# Patient Record
Sex: MALE | Race: WHITE | Employment: OTHER | URBAN - NONMETROPOLITAN AREA
[De-identification: names, ages, dates, MRNs, and addresses within clinical notes are randomized per-mention and may not be internally consistent; named-entity substitution may affect disease eponyms.]

---

## 2023-10-12 ENCOUNTER — OFFICE VISIT (OUTPATIENT)
Dept: PULMONOLOGY | Age: 68
End: 2023-10-12
Payer: MEDICARE

## 2023-10-12 VITALS
SYSTOLIC BLOOD PRESSURE: 118 MMHG | HEART RATE: 69 BPM | WEIGHT: 315 LBS | OXYGEN SATURATION: 96 % | BODY MASS INDEX: 42.66 KG/M2 | HEIGHT: 72 IN | TEMPERATURE: 98.9 F | DIASTOLIC BLOOD PRESSURE: 68 MMHG

## 2023-10-12 DIAGNOSIS — J44.9 STAGE 3 SEVERE COPD BY GOLD CLASSIFICATION (HCC): ICD-10-CM

## 2023-10-12 DIAGNOSIS — J44.9 OSA AND COPD OVERLAP SYNDROME (HCC): Primary | ICD-10-CM

## 2023-10-12 DIAGNOSIS — E66.01 MORBID OBESITY WITH BMI OF 50.0-59.9, ADULT (HCC): ICD-10-CM

## 2023-10-12 DIAGNOSIS — J44.9 COPD, SEVERE (HCC): ICD-10-CM

## 2023-10-12 DIAGNOSIS — G47.33 OSA AND COPD OVERLAP SYNDROME (HCC): Primary | ICD-10-CM

## 2023-10-12 DIAGNOSIS — Z87.891 PERSONAL HISTORY OF SMOKING: ICD-10-CM

## 2023-10-12 PROCEDURE — 1123F ACP DISCUSS/DSCN MKR DOCD: CPT | Performed by: INTERNAL MEDICINE

## 2023-10-12 PROCEDURE — 99204 OFFICE O/P NEW MOD 45 MIN: CPT | Performed by: INTERNAL MEDICINE

## 2023-10-12 RX ORDER — PANTOPRAZOLE SODIUM 40 MG/1
40 TABLET, DELAYED RELEASE ORAL DAILY
COMMUNITY

## 2023-10-12 RX ORDER — TRAZODONE HYDROCHLORIDE 50 MG/1
100 TABLET ORAL NIGHTLY
COMMUNITY
Start: 2015-12-11

## 2023-10-12 RX ORDER — SPIRONOLACTONE 25 MG/1
25 TABLET ORAL
COMMUNITY

## 2023-10-12 RX ORDER — EPINEPHRINE 0.3 MG/.3ML
INJECTION SUBCUTANEOUS
COMMUNITY
Start: 2023-09-27

## 2023-10-12 RX ORDER — DUPILUMAB 300 MG/2ML
300 INJECTION, SOLUTION SUBCUTANEOUS ONCE
COMMUNITY

## 2023-10-12 RX ORDER — TORSEMIDE 100 MG/1
100 TABLET ORAL DAILY
COMMUNITY
Start: 2023-09-05

## 2023-10-12 RX ORDER — LORAZEPAM 1 MG/1
1 TABLET ORAL 2 TIMES DAILY
COMMUNITY
Start: 2012-11-23

## 2023-10-12 RX ORDER — ALBUTEROL SULFATE 90 UG/1
2 AEROSOL, METERED RESPIRATORY (INHALATION) EVERY 4 HOURS PRN
COMMUNITY

## 2023-10-12 RX ORDER — ACETAMINOPHEN 325 MG/1
650 TABLET ORAL EVERY 6 HOURS PRN
COMMUNITY

## 2023-10-12 RX ORDER — POTASSIUM CHLORIDE 750 MG/1
10 TABLET, EXTENDED RELEASE ORAL 4 TIMES DAILY
COMMUNITY

## 2023-10-12 ASSESSMENT — ENCOUNTER SYMPTOMS
VOICE CHANGE: 0
BACK PAIN: 1
ABDOMINAL PAIN: 0
STRIDOR: 0
COUGH: 1
SHORTNESS OF BREATH: 1
CHOKING: 0
TROUBLE SWALLOWING: 0
WHEEZING: 1
ABDOMINAL DISTENTION: 0
CHEST TIGHTNESS: 0

## 2023-10-12 NOTE — PROGRESS NOTES
Subjective:      Patient ID: Pari Paige is a 79 y.o. male. CC: COPD  HPI    Patient is a 77-year-old gentleman referred by his cardiologist Dr. Janee Barber due to recent diagnosis of COPD. 50-pack-year smoker quit smoking about 3 months ago morbidly obese with a BMI of 53 complains of dyspnea on exertion would let Dr. Terryl Jeans to perform pulmonary function test at [facility] revealing severe obstructive pulmonary disease currently on no medications  6-minute walk completed in my office today reveals no need for supplemental oxygen    Bruce Chen has a extensive history of heart disease with requiring paroxysmal atrial fibrillation requiring at least 5 cardioversions and eventually an ablation, history of nasal polyps under the care of of ENT in [place] Dr. Tania Duffy and referred to the [facility] asthma center Dr. Hugo Oswald for Dupixent injections. Bruce Chen tells me that his primary care physician in [place] is in charge of doing his screening CT scans of the chest for early diagnosis of lung cancer. 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 HENT:  Negative for trouble swallowing and voice change. Eyes:  Negative for visual disturbance. Respiratory:  Positive for cough, shortness of breath and wheezing. Negative for choking, chest tightness and stridor. Cardiovascular:  Positive for chest pain, palpitations and leg swelling. Gastrointestinal:  Negative for abdominal distention and abdominal pain.    Endocrine:

## 2023-11-07 ENCOUNTER — TELEPHONE (OUTPATIENT)
Dept: PULMONOLOGY | Age: 68
End: 2023-11-07

## 2023-11-07 NOTE — TELEPHONE ENCOUNTER
Patient called in today asking about patient assistance programs for trelegy . .. I advised him to go to [employer] to view forms he needs to fill out. He also stated that he he does not have drug coverage and was wondering about some more trelegy samples while he gets this sorted out. He stated the trelegy is helping him but is unaffordable. He did say you mentioned another alternative if trelegy was too expensive but I didn't see it in the notes. he stated he will be out tomorrow. Please advise, thank you !

## 2023-11-08 NOTE — TELEPHONE ENCOUNTER
Patient is calling to check on this. He is wanting to know if it would be at all possible for him to  the samples from the [facility] office tomorrow, Thursday 11/08/2023.  Please advise

## 2023-11-08 NOTE — TELEPHONE ENCOUNTER
I do not see which Trelegy you provided samples of . If you let me know which dosage and how many samples I will put them up front for the patient and notify him. Thanks!

## 2023-11-09 NOTE — TELEPHONE ENCOUNTER
PATIENT CALLED AND SAID HE HAS NO MEDICATION LEFT AND NEEDS THE TRELEGY I TOLD HIM I WOULD PUT IN A MESSAGE AND GET BACK TO HIM AS SOON AS I GOT AN ANSWER    PLEASE ADVISE

## 2023-11-13 ENCOUNTER — TELEPHONE (OUTPATIENT)
Dept: PULMONOLOGY | Age: 68
End: 2023-11-13

## 2023-11-13 NOTE — TELEPHONE ENCOUNTER
Patient called regarding the samples. Patient informed that I would get 3 samples ready for him and they will be at the  at the [facility] office. Patient voiced understanding and will  today.

## 2023-11-13 NOTE — TELEPHONE ENCOUNTER
Pt stopped in office today to drop off [employer] paperwork for us to fax. Could you please write an Rx for Trelegy for us to fax with the [employer] paperwork? Please advise, thank you.

## 2023-11-13 NOTE — TELEPHONE ENCOUNTER
Dr. Sarah Stephenson,   Can you print a script for us to fax to [address] [place] [address] patient assistance program?

## 2024-04-16 ENCOUNTER — HOSPITAL ENCOUNTER (OUTPATIENT)
Dept: CT IMAGING | Age: 69
Discharge: HOME | End: 2024-04-16
Attending: RADIOLOGY

## 2024-04-16 DIAGNOSIS — Z00.6 ENCOUNTER FOR EXAMINATION FOR NORMAL COMPARISON OR CONTROL IN CLINICAL RESEARCH PROGRAM: ICD-10-CM

## 2024-04-17 ENCOUNTER — OFFICE VISIT (OUTPATIENT)
Dept: PULMONOLOGY | Age: 69
End: 2024-04-17
Payer: MEDICARE

## 2024-04-17 VITALS
HEART RATE: 59 BPM | SYSTOLIC BLOOD PRESSURE: 128 MMHG | HEIGHT: 72 IN | WEIGHT: 315 LBS | TEMPERATURE: 98.7 F | OXYGEN SATURATION: 94 % | BODY MASS INDEX: 42.66 KG/M2 | DIASTOLIC BLOOD PRESSURE: 70 MMHG

## 2024-04-17 DIAGNOSIS — G47.33 OSA AND COPD OVERLAP SYNDROME (HCC): ICD-10-CM

## 2024-04-17 DIAGNOSIS — Z87.891 PERSONAL HISTORY OF SMOKING: ICD-10-CM

## 2024-04-17 DIAGNOSIS — J44.9 STAGE 3 SEVERE COPD BY GOLD CLASSIFICATION (HCC): Primary | ICD-10-CM

## 2024-04-17 DIAGNOSIS — Z87.891 PERSONAL HISTORY OF TOBACCO USE: ICD-10-CM

## 2024-04-17 DIAGNOSIS — J44.9 OSA AND COPD OVERLAP SYNDROME (HCC): ICD-10-CM

## 2024-04-17 DIAGNOSIS — E66.01 MORBID OBESITY WITH BMI OF 50.0-59.9, ADULT (HCC): ICD-10-CM

## 2024-04-17 PROCEDURE — 3078F DIAST BP <80 MM HG: CPT | Performed by: NURSE PRACTITIONER

## 2024-04-17 PROCEDURE — 3074F SYST BP LT 130 MM HG: CPT | Performed by: NURSE PRACTITIONER

## 2024-04-17 PROCEDURE — G0296 VISIT TO DETERM LDCT ELIG: HCPCS | Performed by: NURSE PRACTITIONER

## 2024-04-17 PROCEDURE — 1123F ACP DISCUSS/DSCN MKR DOCD: CPT | Performed by: NURSE PRACTITIONER

## 2024-04-17 PROCEDURE — 99214 OFFICE O/P EST MOD 30 MIN: CPT | Performed by: NURSE PRACTITIONER

## 2024-04-17 RX ORDER — M-VIT,TX,IRON,MINS/CALC/FOLIC 27MG-0.4MG
1 TABLET ORAL DAILY
COMMUNITY

## 2024-04-17 RX ORDER — LANOLIN ALCOHOL/MO/W.PET/CERES
400 CREAM (GRAM) TOPICAL DAILY
COMMUNITY

## 2024-04-17 ASSESSMENT — ENCOUNTER SYMPTOMS
SHORTNESS OF BREATH: 1
BACK PAIN: 1

## 2024-04-17 NOTE — PROGRESS NOTES
Wt Readings from Last 3 Encounters:   24 (!) 176.9 kg (390 lb)   10/12/23 (!) 179.4 kg (395 lb 6.4 oz)       Physical Exam  Vitals and nursing note reviewed.   Constitutional:       General: He is not in acute distress.     Appearance: He is well-developed. He is morbidly obese.   HENT:      Mouth/Throat:      Lips: Pink.      Mouth: Mucous membranes are moist.      Pharynx: Oropharynx is clear. No oropharyngeal exudate or posterior oropharyngeal erythema.   Eyes:      Conjunctiva/sclera: Conjunctivae normal.   Neck:      Vascular: No JVD.   Cardiovascular:      Rate and Rhythm: Normal rate and regular rhythm.      Heart sounds: No murmur heard.     No friction rub.   Pulmonary:      Effort: Pulmonary effort is normal. No accessory muscle usage or respiratory distress.      Breath sounds: Normal breath sounds. No wheezing, rhonchi or rales.   Chest:      Chest wall: No tenderness.   Musculoskeletal:      Right lower le+ Edema present.      Left lower le+ Edema present.   Skin:     General: Skin is warm and dry.      Capillary Refill: Capillary refill takes less than 2 seconds.      Nails: There is no clubbing.   Neurological:      Mental Status: He is alert and oriented to person, place, and time.   Psychiatric:         Mood and Affect: Mood normal.         Behavior: Behavior normal.         Thought Content: Thought content normal.         Judgment: Judgment normal.           Test results   Lung Nodule Screening     [] Qualifies    [x]Does not qualify   [] Declined    [] Completed    Assessment       ICD-10-CM    1. Stage 3 severe COPD by GOLD classification ([facility])  J44.9       2. Morbid obesity with BMI of 50.0-59.9, adult ([facility])  E66.01     Z68.43       3. AIDE and COPD overlap syndrome ([facility])  G47.33     J44.9       4. Personal history of smoking  Z87.891             Johnathan Weinstein was seen today for follow-up.    Diagnoses and all orders for this visit:    Stage 3 severe COPD by GOLD

## 2024-10-16 DIAGNOSIS — J44.9 STAGE 3 SEVERE COPD BY GOLD CLASSIFICATION (HCC): Primary | ICD-10-CM

## 2024-10-16 NOTE — PROGRESS NOTES
Pt walked up to window at office today with paperwork for [employer] assistance, asking for printed Rx for trelegy.   Rx printed and given to patient

## 2024-10-24 ENCOUNTER — TELEPHONE (OUTPATIENT)
Dept: PULMONOLOGY | Age: 69
End: 2024-10-24

## 2024-10-24 NOTE — TELEPHONE ENCOUNTER
Attempted to start prior authorization request through [employer] portal for CT lung screen scheduled for 11/1/24 at [facility].    Saw authorization already received on portal for CT lung screen (see media).    Order ID: [id number]  Valid: 10/21/24-1/18/25

## 2024-10-30 ENCOUNTER — TELEPHONE (OUTPATIENT)
Dept: PULMONOLOGY | Age: 69
End: 2024-10-30

## 2024-10-30 DIAGNOSIS — J44.9 STAGE 3 SEVERE COPD BY GOLD CLASSIFICATION (HCC): ICD-10-CM

## 2024-10-30 NOTE — TELEPHONE ENCOUNTER
Patient called in stating that he received a letter from [employer] stating that a script and letterhead was not sent with his application form. I told patient that I would ask [employer] to print out the Trelegy script on script paper and that I would fax everything back to [employer] with our letter head. Patient verbalized understanding. [employer] please print out Trelegy script on script paper thanks!

## 2024-10-31 NOTE — TELEPHONE ENCOUNTER
I went to [employer] and had her re-print the script. I placed script on [employer]a's desk for signature.

## 2024-10-31 NOTE — TELEPHONE ENCOUNTER
I could not find the script paper here in the office. I will look again tomorrow and if I cannot find it I will have [employer] print it out again.

## 2024-11-06 DIAGNOSIS — Z87.891 PERSONAL HISTORY OF TOBACCO USE: ICD-10-CM

## 2024-11-13 ENCOUNTER — OFFICE VISIT (OUTPATIENT)
Dept: PULMONOLOGY | Age: 69
End: 2024-11-13

## 2024-11-13 VITALS
BODY MASS INDEX: 42.66 KG/M2 | WEIGHT: 315 LBS | OXYGEN SATURATION: 95 % | HEIGHT: 72 IN | HEART RATE: 64 BPM | TEMPERATURE: 98.1 F | DIASTOLIC BLOOD PRESSURE: 78 MMHG | SYSTOLIC BLOOD PRESSURE: 124 MMHG

## 2024-11-13 DIAGNOSIS — E66.01 MORBID OBESITY WITH BMI OF 50.0-59.9, ADULT: ICD-10-CM

## 2024-11-13 DIAGNOSIS — Z87.891 PERSONAL HISTORY OF SMOKING: ICD-10-CM

## 2024-11-13 DIAGNOSIS — J44.9 STAGE 3 SEVERE COPD BY GOLD CLASSIFICATION (HCC): Primary | ICD-10-CM

## 2024-11-13 RX ORDER — FLUTICASONE PROPIONATE 50 MCG
2 SPRAY, SUSPENSION (ML) NASAL DAILY
COMMUNITY
Start: 2024-10-31 | End: 2025-10-31

## 2024-11-13 RX ORDER — BUPROPION HYDROCHLORIDE 150 MG/1
150 TABLET ORAL 2 TIMES DAILY
COMMUNITY

## 2024-11-13 ASSESSMENT — ENCOUNTER SYMPTOMS
SHORTNESS OF BREATH: 1
BACK PAIN: 1

## 2024-11-13 NOTE — PROGRESS NOTES
[facility]     Patient: NIESHA ACEVEDO, 69 y.o.   : 2022    Pt of Dr. OSHEA      Subjective     Chief Complaint   Patient presents with    Follow-up     6 month COPD follow up        HPI      Patient presents for 6 months COPD  follow up   6 min walk. 10/12/23- no hypoxia , is not on home o2   PFT 2023  History of RUIN-Y surgery, lost > 200lbs initially , Has gained 100lbs over the last year, states this was attributed to his uncontrolled AFIB . Has had several ablations / medication changes, and has pacemaker , States is now controlled  Chronic pain limits his ambulation ability   Stable/ chronic dyspnea with movement /walking . Recovers quickly with sitting and resting.   No cough or wheeze.   Using trelegy 100/62.5/25 mcg 1 puff daily with compliance - gets through patient assistance with [employer]   Will use albuterol HFA typically evernight before bed.   Former smoker       Immunization History   Administered Date(s) Administered    COVID-19, MODERNA, (age 12y+), IM, 50mcg/0.5mL 2023    COVID-19, PFIZER, (age 12y+), IM, 30mcg/0.3mL 10/21/2024    Influenza, FLUARIX, FLULAVAL, FLUZONE (age 6 mo+) and AFLURIA, (age 3 y+), Quadv PF, 0.5mL 10/03/2018, 2019    Pneumococcal, PPSV23, PNEUMOVAX 23, (age 2y+), SC/IM, 0.5mL 2012, 2015    Zoster Recombinant (Shingrix) 10/03/2018, 2018       SOCIAL HISTORY:  Social History     Tobacco Use    Smoking status: Former     Current packs/day: 0.00     Average packs/day: 1 pack/day for 54.5 years (54.5 ttl pk-yrs)     Types: Cigarettes     Start date:      Quit date: 2023     Years since quittin.3    Smokeless tobacco: Never   Substance Use Topics    Alcohol use: Not Currently     Comment: rare beer    Drug use: Never       CURRENT MEDICATIONS:  Current Outpatient Medications   Medication Sig Dispense Refill    buPROPion (WELLBUTRIN XL) 150 MG extended release tablet Take 1 tablet

## 2025-05-12 DIAGNOSIS — E66.01 MORBID OBESITY WITH BMI OF 50.0-59.9, ADULT (HCC): ICD-10-CM

## 2025-05-12 DIAGNOSIS — J44.9 STAGE 3 SEVERE COPD BY GOLD CLASSIFICATION (HCC): ICD-10-CM

## 2025-05-12 DIAGNOSIS — Z87.891 PERSONAL HISTORY OF SMOKING: ICD-10-CM

## 2025-05-14 ENCOUNTER — OFFICE VISIT (OUTPATIENT)
Dept: PULMONOLOGY | Age: 70
End: 2025-05-14
Payer: MEDICARE

## 2025-05-14 VITALS
HEART RATE: 63 BPM | DIASTOLIC BLOOD PRESSURE: 78 MMHG | HEIGHT: 72 IN | OXYGEN SATURATION: 96 % | BODY MASS INDEX: 42.66 KG/M2 | TEMPERATURE: 98.4 F | WEIGHT: 315 LBS | SYSTOLIC BLOOD PRESSURE: 126 MMHG

## 2025-05-14 DIAGNOSIS — Z87.891 PERSONAL HISTORY OF TOBACCO USE: ICD-10-CM

## 2025-05-14 DIAGNOSIS — J98.4 RESTRICTIVE LUNG DISEASE SECONDARY TO OBESITY: ICD-10-CM

## 2025-05-14 DIAGNOSIS — E66.9 RESTRICTIVE LUNG DISEASE SECONDARY TO OBESITY: ICD-10-CM

## 2025-05-14 DIAGNOSIS — J44.9 STAGE 3 SEVERE COPD BY GOLD CLASSIFICATION (HCC): Primary | ICD-10-CM

## 2025-05-14 DIAGNOSIS — E66.01 MORBID (SEVERE) OBESITY DUE TO EXCESS CALORIES (HCC): ICD-10-CM

## 2025-05-14 DIAGNOSIS — Z95.0 STATUS POST CARDIAC PACEMAKER PROCEDURE: ICD-10-CM

## 2025-05-14 PROCEDURE — 99214 OFFICE O/P EST MOD 30 MIN: CPT | Performed by: NURSE PRACTITIONER

## 2025-05-14 PROCEDURE — 3078F DIAST BP <80 MM HG: CPT | Performed by: NURSE PRACTITIONER

## 2025-05-14 PROCEDURE — 1160F RVW MEDS BY RX/DR IN RCRD: CPT | Performed by: NURSE PRACTITIONER

## 2025-05-14 PROCEDURE — 1123F ACP DISCUSS/DSCN MKR DOCD: CPT | Performed by: NURSE PRACTITIONER

## 2025-05-14 PROCEDURE — 3074F SYST BP LT 130 MM HG: CPT | Performed by: NURSE PRACTITIONER

## 2025-05-14 PROCEDURE — G0296 VISIT TO DETERM LDCT ELIG: HCPCS | Performed by: NURSE PRACTITIONER

## 2025-05-14 PROCEDURE — 1159F MED LIST DOCD IN RCRD: CPT | Performed by: NURSE PRACTITIONER

## 2025-05-14 NOTE — PROGRESS NOTES
[facility]     Patient: NIESHA ACEVEDO, 70 y.o.   : 1955    Pt of Dr. Diya Corley     Chief Complaint   Patient presents with    Follow-up     6 month COPD with pft         History of Present Illness  The patient is a 70-year-old male who presents for a 6-month COPD follow-up with a pulmonary function test.    He reports that his respiratory status has remained stable over the past 6 months.   He experiences dyspnea upon exertion, such as walking a distance of 10 feet, but this resolves with rest or cessation of activity.   He does not experience any wheezing since he quit smoking.   He occasionally coughs, but it is not a cause for concern.   He has not had any respiratory infections requiring antibiotics or steroids.   He has previously attempted pulmonary rehabilitation but discontinued due to cost and physical strain.   He plans to incorporate walking into his routine and will carry albuterol as a precaution.   He has attempted to use a treadmill but found it unstable and nearly fell twice.   He continues to use a BiPAP machine at night, which is managed by his primary care physician for sleep apnea .   He is not on home oxygen therapy. He has 3 pulse oximeters at home.   His current medication regimen includes daily use of Trelegy inhaler, with appropriate oral hygiene post-inhalation.   He also uses albuterol as a rescue inhaler in the morning and at night when he uses his BiPAP machine.   He has a single oral ulcer, attributed to self-inflicted trauma of bitting cheek, has been evaluated by his dentist, and does not believe it is related to Trelegy use.   He does not wear dentures.    Supplemental Information  He has a history of atrial fibrillation, which was managed with 6 cardioversions over a 2-year period, culminating in an ablation procedure in 2024. He also has a history of congestive heart failure. He has experienced significant